# Patient Record
Sex: MALE | Race: BLACK OR AFRICAN AMERICAN | NOT HISPANIC OR LATINO | URBAN - METROPOLITAN AREA
[De-identification: names, ages, dates, MRNs, and addresses within clinical notes are randomized per-mention and may not be internally consistent; named-entity substitution may affect disease eponyms.]

---

## 2017-01-25 ENCOUNTER — OUTPATIENT (OUTPATIENT)
Dept: OUTPATIENT SERVICES | Facility: HOSPITAL | Age: 32
LOS: 1 days | Discharge: HOME | End: 2017-01-25

## 2017-04-05 ENCOUNTER — OUTPATIENT (OUTPATIENT)
Dept: OUTPATIENT SERVICES | Facility: HOSPITAL | Age: 32
LOS: 1 days | Discharge: HOME | End: 2017-04-05

## 2017-06-27 DIAGNOSIS — F31.30 BIPOLAR DISORDER, CURRENT EPISODE DEPRESSED, MILD OR MODERATE SEVERITY, UNSPECIFIED: ICD-10-CM

## 2017-06-28 ENCOUNTER — OUTPATIENT (OUTPATIENT)
Dept: OUTPATIENT SERVICES | Facility: HOSPITAL | Age: 32
LOS: 1 days | Discharge: HOME | End: 2017-06-28

## 2017-06-28 DIAGNOSIS — F20.0 PARANOID SCHIZOPHRENIA: ICD-10-CM

## 2017-06-28 DIAGNOSIS — F31.30 BIPOLAR DISORDER, CURRENT EPISODE DEPRESSED, MILD OR MODERATE SEVERITY, UNSPECIFIED: ICD-10-CM

## 2017-09-06 ENCOUNTER — OUTPATIENT (OUTPATIENT)
Dept: OUTPATIENT SERVICES | Facility: HOSPITAL | Age: 32
LOS: 1 days | Discharge: HOME | End: 2017-09-06

## 2017-09-06 DIAGNOSIS — F31.30 BIPOLAR DISORDER, CURRENT EPISODE DEPRESSED, MILD OR MODERATE SEVERITY, UNSPECIFIED: ICD-10-CM

## 2017-11-15 ENCOUNTER — OUTPATIENT (OUTPATIENT)
Dept: OUTPATIENT SERVICES | Facility: HOSPITAL | Age: 32
LOS: 1 days | Discharge: HOME | End: 2017-11-15

## 2017-11-15 DIAGNOSIS — F31.30 BIPOLAR DISORDER, CURRENT EPISODE DEPRESSED, MILD OR MODERATE SEVERITY, UNSPECIFIED: ICD-10-CM

## 2018-01-02 ENCOUNTER — OUTPATIENT (OUTPATIENT)
Dept: OUTPATIENT SERVICES | Facility: HOSPITAL | Age: 33
LOS: 1 days | Discharge: HOME | End: 2018-01-02

## 2018-01-02 DIAGNOSIS — F20.0 PARANOID SCHIZOPHRENIA: ICD-10-CM

## 2018-01-02 DIAGNOSIS — F31.0 BIPOLAR DISORDER, CURRENT EPISODE HYPOMANIC: ICD-10-CM

## 2018-01-17 ENCOUNTER — OUTPATIENT (OUTPATIENT)
Dept: OUTPATIENT SERVICES | Facility: HOSPITAL | Age: 33
LOS: 1 days | Discharge: HOME | End: 2018-01-17

## 2018-01-17 DIAGNOSIS — F31.30 BIPOLAR DISORDER, CURRENT EPISODE DEPRESSED, MILD OR MODERATE SEVERITY, UNSPECIFIED: ICD-10-CM

## 2018-03-09 ENCOUNTER — OUTPATIENT (OUTPATIENT)
Dept: OUTPATIENT SERVICES | Facility: HOSPITAL | Age: 33
LOS: 1 days | Discharge: HOME | End: 2018-03-09

## 2018-03-09 DIAGNOSIS — F31.30 BIPOLAR DISORDER, CURRENT EPISODE DEPRESSED, MILD OR MODERATE SEVERITY, UNSPECIFIED: ICD-10-CM

## 2018-05-09 ENCOUNTER — OUTPATIENT (OUTPATIENT)
Dept: OUTPATIENT SERVICES | Facility: HOSPITAL | Age: 33
LOS: 1 days | Discharge: HOME | End: 2018-05-09

## 2018-05-09 DIAGNOSIS — F31.30 BIPOLAR DISORDER, CURRENT EPISODE DEPRESSED, MILD OR MODERATE SEVERITY, UNSPECIFIED: ICD-10-CM

## 2018-05-16 ENCOUNTER — OUTPATIENT (OUTPATIENT)
Dept: OUTPATIENT SERVICES | Facility: HOSPITAL | Age: 33
LOS: 1 days | Discharge: HOME | End: 2018-05-16

## 2018-05-16 DIAGNOSIS — F31.30 BIPOLAR DISORDER, CURRENT EPISODE DEPRESSED, MILD OR MODERATE SEVERITY, UNSPECIFIED: ICD-10-CM

## 2018-07-18 ENCOUNTER — OUTPATIENT (OUTPATIENT)
Dept: OUTPATIENT SERVICES | Facility: HOSPITAL | Age: 33
LOS: 1 days | Discharge: HOME | End: 2018-07-18

## 2018-07-18 DIAGNOSIS — F31.30 BIPOLAR DISORDER, CURRENT EPISODE DEPRESSED, MILD OR MODERATE SEVERITY, UNSPECIFIED: ICD-10-CM

## 2018-09-19 ENCOUNTER — OUTPATIENT (OUTPATIENT)
Dept: OUTPATIENT SERVICES | Facility: HOSPITAL | Age: 33
LOS: 1 days | Discharge: HOME | End: 2018-09-19

## 2018-09-19 DIAGNOSIS — F31.30 BIPOLAR DISORDER, CURRENT EPISODE DEPRESSED, MILD OR MODERATE SEVERITY, UNSPECIFIED: ICD-10-CM

## 2018-11-21 ENCOUNTER — OUTPATIENT (OUTPATIENT)
Dept: OUTPATIENT SERVICES | Facility: HOSPITAL | Age: 33
LOS: 1 days | Discharge: HOME | End: 2018-11-21

## 2018-11-21 DIAGNOSIS — F31.30 BIPOLAR DISORDER, CURRENT EPISODE DEPRESSED, MILD OR MODERATE SEVERITY, UNSPECIFIED: ICD-10-CM

## 2019-01-24 ENCOUNTER — OUTPATIENT (OUTPATIENT)
Dept: OUTPATIENT SERVICES | Facility: HOSPITAL | Age: 34
LOS: 1 days | Discharge: HOME | End: 2019-01-24

## 2019-01-24 DIAGNOSIS — F31.0 BIPOLAR DISORDER, CURRENT EPISODE HYPOMANIC: ICD-10-CM

## 2019-02-06 ENCOUNTER — OUTPATIENT (OUTPATIENT)
Dept: OUTPATIENT SERVICES | Facility: HOSPITAL | Age: 34
LOS: 1 days | Discharge: HOME | End: 2019-02-06

## 2019-02-06 DIAGNOSIS — F31.30 BIPOLAR DISORDER, CURRENT EPISODE DEPRESSED, MILD OR MODERATE SEVERITY, UNSPECIFIED: ICD-10-CM

## 2019-04-10 ENCOUNTER — OUTPATIENT (OUTPATIENT)
Dept: OUTPATIENT SERVICES | Facility: HOSPITAL | Age: 34
LOS: 1 days | Discharge: HOME | End: 2019-04-10

## 2019-04-10 DIAGNOSIS — F31.74 BIPOLAR DISORDER, IN FULL REMISSION, MOST RECENT EPISODE MANIC: ICD-10-CM

## 2019-06-14 ENCOUNTER — OUTPATIENT (OUTPATIENT)
Dept: OUTPATIENT SERVICES | Facility: HOSPITAL | Age: 34
LOS: 1 days | Discharge: HOME | End: 2019-06-14

## 2019-06-14 DIAGNOSIS — Z00.00 ENCOUNTER FOR GENERAL ADULT MEDICAL EXAMINATION WITHOUT ABNORMAL FINDINGS: ICD-10-CM

## 2019-06-26 ENCOUNTER — OUTPATIENT (OUTPATIENT)
Dept: OUTPATIENT SERVICES | Facility: HOSPITAL | Age: 34
LOS: 1 days | Discharge: HOME | End: 2019-06-26

## 2019-06-26 DIAGNOSIS — F31.74 BIPOLAR DISORDER, IN FULL REMISSION, MOST RECENT EPISODE MANIC: ICD-10-CM

## 2019-09-25 ENCOUNTER — OUTPATIENT (OUTPATIENT)
Dept: OUTPATIENT SERVICES | Facility: HOSPITAL | Age: 34
LOS: 1 days | Discharge: HOME | End: 2019-09-25
Payer: SUBSIDIZED

## 2019-09-25 DIAGNOSIS — F31.74 BIPOLAR DISORDER, IN FULL REMISSION, MOST RECENT EPISODE MANIC: ICD-10-CM

## 2019-09-25 PROCEDURE — 99214 OFFICE O/P EST MOD 30 MIN: CPT

## 2019-12-07 ENCOUNTER — OUTPATIENT (OUTPATIENT)
Dept: OUTPATIENT SERVICES | Facility: HOSPITAL | Age: 34
LOS: 1 days | Discharge: HOME | End: 2019-12-07

## 2019-12-07 DIAGNOSIS — F31.74 BIPOLAR DISORDER, IN FULL REMISSION, MOST RECENT EPISODE MANIC: ICD-10-CM

## 2019-12-18 ENCOUNTER — OUTPATIENT (OUTPATIENT)
Dept: OUTPATIENT SERVICES | Facility: HOSPITAL | Age: 34
LOS: 1 days | Discharge: HOME | End: 2019-12-18
Payer: SUBSIDIZED

## 2019-12-18 DIAGNOSIS — F31.74 BIPOLAR DISORDER, IN FULL REMISSION, MOST RECENT EPISODE MANIC: ICD-10-CM

## 2019-12-18 PROCEDURE — 99213 OFFICE O/P EST LOW 20 MIN: CPT

## 2020-03-18 ENCOUNTER — OUTPATIENT (OUTPATIENT)
Dept: OUTPATIENT SERVICES | Facility: HOSPITAL | Age: 35
LOS: 1 days | Discharge: HOME | End: 2020-03-18
Payer: SUBSIDIZED

## 2020-03-18 PROCEDURE — 99441: CPT

## 2020-03-24 DIAGNOSIS — F31.74 BIPOLAR DISORDER, IN FULL REMISSION, MOST RECENT EPISODE MANIC: ICD-10-CM

## 2020-05-13 ENCOUNTER — OUTPATIENT (OUTPATIENT)
Dept: OUTPATIENT SERVICES | Facility: HOSPITAL | Age: 35
LOS: 1 days | Discharge: HOME | End: 2020-05-13

## 2020-05-13 DIAGNOSIS — F31.74 BIPOLAR DISORDER, IN FULL REMISSION, MOST RECENT EPISODE MANIC: ICD-10-CM

## 2020-07-29 ENCOUNTER — OUTPATIENT (OUTPATIENT)
Dept: OUTPATIENT SERVICES | Facility: HOSPITAL | Age: 35
LOS: 1 days | Discharge: HOME | End: 2020-07-29
Payer: SUBSIDIZED

## 2020-07-29 DIAGNOSIS — F31.74 BIPOLAR DISORDER, IN FULL REMISSION, MOST RECENT EPISODE MANIC: ICD-10-CM

## 2020-07-29 PROCEDURE — 99213 OFFICE O/P EST LOW 20 MIN: CPT | Mod: 95

## 2020-10-21 ENCOUNTER — OUTPATIENT (OUTPATIENT)
Dept: OUTPATIENT SERVICES | Facility: HOSPITAL | Age: 35
LOS: 1 days | Discharge: HOME | End: 2020-10-21
Payer: SUBSIDIZED

## 2020-10-21 DIAGNOSIS — F31.74 BIPOLAR DISORDER, IN FULL REMISSION, MOST RECENT EPISODE MANIC: ICD-10-CM

## 2020-10-21 PROCEDURE — 99213 OFFICE O/P EST LOW 20 MIN: CPT | Mod: 95

## 2021-01-13 ENCOUNTER — OUTPATIENT (OUTPATIENT)
Dept: OUTPATIENT SERVICES | Facility: HOSPITAL | Age: 36
LOS: 1 days | Discharge: HOME | End: 2021-01-13

## 2021-01-13 ENCOUNTER — APPOINTMENT (OUTPATIENT)
Dept: PSYCHIATRY | Facility: CLINIC | Age: 36
End: 2021-01-13
Payer: SUBSIDIZED

## 2021-01-13 DIAGNOSIS — F31.74 BIPOLAR DISORDER, IN FULL REMISSION, MOST RECENT EPISODE MANIC: ICD-10-CM

## 2021-01-13 PROBLEM — Z00.00 ENCOUNTER FOR PREVENTIVE HEALTH EXAMINATION: Status: ACTIVE | Noted: 2021-01-13

## 2021-01-13 PROCEDURE — 99442: CPT

## 2021-04-19 ENCOUNTER — APPOINTMENT (OUTPATIENT)
Dept: PSYCHIATRY | Facility: CLINIC | Age: 36
End: 2021-04-19
Payer: SUBSIDIZED

## 2021-04-19 ENCOUNTER — OUTPATIENT (OUTPATIENT)
Dept: OUTPATIENT SERVICES | Facility: HOSPITAL | Age: 36
LOS: 1 days | Discharge: HOME | End: 2021-04-19

## 2021-04-19 VITALS — BODY MASS INDEX: 24.64 KG/M2 | HEIGHT: 71 IN | WEIGHT: 176 LBS

## 2021-04-19 DIAGNOSIS — F31.74 BIPOLAR DISORDER, IN FULL REMISSION, MOST RECENT EPISODE MANIC: ICD-10-CM

## 2021-04-19 PROCEDURE — 99213 OFFICE O/P EST LOW 20 MIN: CPT | Mod: 95

## 2021-05-12 ENCOUNTER — OUTPATIENT (OUTPATIENT)
Dept: OUTPATIENT SERVICES | Facility: HOSPITAL | Age: 36
LOS: 1 days | Discharge: HOME | End: 2021-05-12

## 2021-05-12 ENCOUNTER — APPOINTMENT (OUTPATIENT)
Dept: HEMATOLOGY ONCOLOGY | Facility: CLINIC | Age: 36
End: 2021-05-12
Payer: SUBSIDIZED

## 2021-05-12 ENCOUNTER — LABORATORY RESULT (OUTPATIENT)
Age: 36
End: 2021-05-12

## 2021-05-12 VITALS
HEIGHT: 71 IN | HEART RATE: 57 BPM | DIASTOLIC BLOOD PRESSURE: 78 MMHG | TEMPERATURE: 96.2 F | SYSTOLIC BLOOD PRESSURE: 130 MMHG | BODY MASS INDEX: 24.5 KG/M2 | WEIGHT: 175 LBS | RESPIRATION RATE: 16 BRPM

## 2021-05-12 DIAGNOSIS — D72.819 DECREASED WHITE BLOOD CELL COUNT, UNSPECIFIED: ICD-10-CM

## 2021-05-12 PROCEDURE — 99204 OFFICE O/P NEW MOD 45 MIN: CPT

## 2021-05-12 NOTE — ASSESSMENT
[FreeTextEntry1] : 34 yo man from [facility] with  mild asymptomatic #Leukopenia, mild, noted since at least 2015. Neutropenia was noted single occurrence in April 2021, one month after completion of MODERNA vaccination (dose 2); today, his WBC and ANC are WNL.\par - denies frequent or recurrent infections\par \par \par #Microcytosis, mild, intermittent; noted since 2018\par - will draw iron studies, ferritin today\par - if normal, will consider sending hgb electrophoresis to r/o thalassemia\par \par RTC in 4 months with CBC, sooner if needed

## 2021-05-12 NOTE — CONSULT LETTER
[Dear  ___] : Dear  [unfilled], [Consult Letter:] : I had the pleasure of evaluating your patient, [unfilled]. [Sincerely,] : Sincerely, [FreeTextEntry3] : Bo Scruggs

## 2021-05-12 NOTE — HISTORY OF PRESENT ILLNESS
[de-identified] : Mr. JUAN FRANCISCO ALEJANDRA is a 35 year old male here today for evaluation and management of leukopenia.\par \par He was referred by Psychiatrist, Dr. Sue.  Juan Francisco is a 36yo M from [facility] with PMHx including bipolar disorder (on Lithium) and HSV2 who presents to clinic to establish care.  He states that he was has noted low WBC since at least 2018.  He denies any fevers, chills, frequent or recent infection.  He had COVID vaccine (Moderna #2) in 03/2021.  We reviewed labwork since 2015 which shows mild leukopenia but normal differential.  \par \par LAB WORKUP\par (4.11.2021) WBC 3.51, Hgb 14.1, MCV 84.6, , ANC 1.70, Cr 1.15, eGFR 81, Cr 1.15, eGFR 81\par (10.3.2020) WBC 4.06, Hgb 14.1, MCV 81.7, , normal diff\par (12.7.2019) WBC 5.9, Hgb 14.2, MCV 81.3, , normal diff\par (5.9.2018) WBC 4.7, Hgb 13.4, MCV 78.4, , normal diff\par (6.25.2015) WBC 3.5, Hgb 13.7, MCV 82.2, , normal diff

## 2021-05-12 NOTE — REVIEW OF SYSTEMS
[Negative] : Allergic/Immunologic [Fever] : no fever [Chills] : no chills [FreeTextEntry9] : reports intermittent L posterior rib pain x 1 week, worse with movement

## 2021-05-13 LAB
FERRITIN SERPL-MCNC: 125 NG/ML
HCT VFR BLD CALC: 42.8 %
HGB BLD-MCNC: 14 G/DL
IRON SATN MFR SERPL: 19 %
IRON SERPL-MCNC: 62 UG/DL
MCHC RBC-ENTMCNC: 25.8 PG
MCHC RBC-ENTMCNC: 32.7 G/DL
MCV RBC AUTO: 79 FL
PLATELET # BLD AUTO: 174 K/UL
PMV BLD: 10.9 FL
RBC # BLD: 5.42 M/UL
RBC # FLD: 13 %
TIBC SERPL-MCNC: 322 UG/DL
UIBC SERPL-MCNC: 260 UG/DL
WBC # FLD AUTO: 5.36 K/UL

## 2021-05-21 DIAGNOSIS — D72.819 DECREASED WHITE BLOOD CELL COUNT, UNSPECIFIED: ICD-10-CM

## 2021-07-12 ENCOUNTER — APPOINTMENT (OUTPATIENT)
Dept: PSYCHIATRY | Facility: CLINIC | Age: 36
End: 2021-07-12

## 2021-07-14 ENCOUNTER — OUTPATIENT (OUTPATIENT)
Dept: OUTPATIENT SERVICES | Facility: HOSPITAL | Age: 36
LOS: 1 days | Discharge: HOME | End: 2021-07-14

## 2021-07-14 ENCOUNTER — APPOINTMENT (OUTPATIENT)
Dept: PSYCHIATRY | Facility: CLINIC | Age: 36
End: 2021-07-14
Payer: SUBSIDIZED

## 2021-07-14 VITALS — WEIGHT: 175 LBS | BODY MASS INDEX: 24.5 KG/M2 | HEIGHT: 71 IN

## 2021-07-14 DIAGNOSIS — F31.74 BIPOLAR DISORDER, IN FULL REMISSION, MOST RECENT EPISODE MANIC: ICD-10-CM

## 2021-07-14 PROCEDURE — 99213 OFFICE O/P EST LOW 20 MIN: CPT

## 2021-09-22 ENCOUNTER — APPOINTMENT (OUTPATIENT)
Dept: HEMATOLOGY ONCOLOGY | Facility: CLINIC | Age: 36
End: 2021-09-22

## 2021-09-29 ENCOUNTER — APPOINTMENT (OUTPATIENT)
Dept: PSYCHIATRY | Facility: CLINIC | Age: 36
End: 2021-09-29
Payer: SUBSIDIZED

## 2021-09-29 ENCOUNTER — OUTPATIENT (OUTPATIENT)
Dept: OUTPATIENT SERVICES | Facility: HOSPITAL | Age: 36
LOS: 1 days | Discharge: HOME | End: 2021-09-29

## 2021-09-29 VITALS — HEIGHT: 71 IN | WEIGHT: 175 LBS | BODY MASS INDEX: 24.5 KG/M2

## 2021-09-29 DIAGNOSIS — F31.74 BIPOLAR DISORDER, IN FULL REMISSION, MOST RECENT EPISODE MANIC: ICD-10-CM

## 2021-09-29 PROCEDURE — 99213 OFFICE O/P EST LOW 20 MIN: CPT

## 2021-10-09 ENCOUNTER — LABORATORY RESULT (OUTPATIENT)
Age: 36
End: 2021-10-09

## 2021-12-22 ENCOUNTER — APPOINTMENT (OUTPATIENT)
Dept: PSYCHIATRY | Facility: CLINIC | Age: 36
End: 2021-12-22
Payer: SUBSIDIZED

## 2021-12-22 ENCOUNTER — OUTPATIENT (OUTPATIENT)
Dept: OUTPATIENT SERVICES | Facility: HOSPITAL | Age: 36
LOS: 1 days | Discharge: HOME | End: 2021-12-22

## 2021-12-22 VITALS — HEIGHT: 71 IN | BODY MASS INDEX: 23.85 KG/M2 | WEIGHT: 170.38 LBS

## 2021-12-22 DIAGNOSIS — F31.74 BIPOLAR DISORDER, IN FULL REMISSION, MOST RECENT EPISODE MANIC: ICD-10-CM

## 2021-12-22 PROCEDURE — 99213 OFFICE O/P EST LOW 20 MIN: CPT

## 2022-02-09 ENCOUNTER — OUTPATIENT (OUTPATIENT)
Dept: OUTPATIENT SERVICES | Facility: HOSPITAL | Age: 37
LOS: 1 days | Discharge: HOME | End: 2022-02-09

## 2022-02-09 ENCOUNTER — APPOINTMENT (OUTPATIENT)
Dept: PSYCHIATRY | Facility: CLINIC | Age: 37
End: 2022-02-09
Payer: SUBSIDIZED

## 2022-02-09 VITALS — HEIGHT: 71 IN | BODY MASS INDEX: 24.36 KG/M2 | WEIGHT: 174 LBS

## 2022-02-09 PROCEDURE — 99213 OFFICE O/P EST LOW 20 MIN: CPT | Mod: 95

## 2022-02-10 DIAGNOSIS — F31.74 BIPOLAR DISORDER, IN FULL REMISSION, MOST RECENT EPISODE MANIC: ICD-10-CM

## 2022-02-26 ENCOUNTER — LABORATORY RESULT (OUTPATIENT)
Age: 37
End: 2022-02-26

## 2022-03-23 ENCOUNTER — APPOINTMENT (OUTPATIENT)
Dept: PSYCHIATRY | Facility: CLINIC | Age: 37
End: 2022-03-23

## 2022-03-30 ENCOUNTER — OUTPATIENT (OUTPATIENT)
Dept: OUTPATIENT SERVICES | Facility: HOSPITAL | Age: 37
LOS: 1 days | Discharge: HOME | End: 2022-03-30

## 2022-03-30 ENCOUNTER — APPOINTMENT (OUTPATIENT)
Dept: PSYCHIATRY | Facility: CLINIC | Age: 37
End: 2022-03-30
Payer: SUBSIDIZED

## 2022-03-30 VITALS — HEIGHT: 71 IN | WEIGHT: 170 LBS | BODY MASS INDEX: 23.8 KG/M2

## 2022-03-30 DIAGNOSIS — F31.74 BIPOLAR DISORDER, IN FULL REMISSION, MOST RECENT EPISODE MANIC: ICD-10-CM

## 2022-03-30 DIAGNOSIS — Z63.0 PROBLEMS IN RELATIONSHIP WITH SPOUSE OR PARTNER: ICD-10-CM

## 2022-03-30 PROCEDURE — 99213 OFFICE O/P EST LOW 20 MIN: CPT

## 2022-03-30 SDOH — SOCIAL STABILITY - SOCIAL INSECURITY: PROBLEMS IN RELATIONSHIP WITH SPOUSE OR PARTNER: Z63.0

## 2022-06-15 ENCOUNTER — APPOINTMENT (OUTPATIENT)
Dept: PSYCHIATRY | Facility: CLINIC | Age: 37
End: 2022-06-15

## 2022-06-15 ENCOUNTER — OUTPATIENT (OUTPATIENT)
Dept: OUTPATIENT SERVICES | Facility: HOSPITAL | Age: 37
LOS: 1 days | Discharge: HOME | End: 2022-06-15

## 2022-06-15 VITALS — HEIGHT: 71 IN | WEIGHT: 173 LBS | BODY MASS INDEX: 24.22 KG/M2

## 2022-06-15 DIAGNOSIS — F31.74 BIPOLAR DISORDER, IN FULL REMISSION, MOST RECENT EPISODE MANIC: ICD-10-CM

## 2022-06-15 DIAGNOSIS — Z63.0 PROBLEMS IN RELATIONSHIP WITH SPOUSE OR PARTNER: ICD-10-CM

## 2022-06-15 PROCEDURE — 99213 OFFICE O/P EST LOW 20 MIN: CPT | Mod: 95

## 2022-06-15 SDOH — SOCIAL STABILITY - SOCIAL INSECURITY: PROBLEMS IN RELATIONSHIP WITH SPOUSE OR PARTNER: Z63.0

## 2022-08-19 ENCOUNTER — LABORATORY RESULT (OUTPATIENT)
Age: 37
End: 2022-08-19

## 2022-08-24 ENCOUNTER — OUTPATIENT (OUTPATIENT)
Dept: OUTPATIENT SERVICES | Facility: HOSPITAL | Age: 37
LOS: 1 days | Discharge: HOME | End: 2022-08-24

## 2022-08-24 ENCOUNTER — APPOINTMENT (OUTPATIENT)
Dept: PSYCHIATRY | Facility: CLINIC | Age: 37
End: 2022-08-24

## 2022-08-24 VITALS — HEIGHT: 71 IN | WEIGHT: 177.31 LBS | BODY MASS INDEX: 24.82 KG/M2

## 2022-08-24 DIAGNOSIS — Z63.0 PROBLEMS IN RELATIONSHIP WITH SPOUSE OR PARTNER: ICD-10-CM

## 2022-08-24 DIAGNOSIS — F31.74 BIPOLAR DISORDER, IN FULL REMISSION, MOST RECENT EPISODE MANIC: ICD-10-CM

## 2022-08-24 PROCEDURE — 99213 OFFICE O/P EST LOW 20 MIN: CPT

## 2022-08-24 SDOH — SOCIAL STABILITY - SOCIAL INSECURITY: PROBLEMS IN RELATIONSHIP WITH SPOUSE OR PARTNER: Z63.0

## 2022-09-14 ENCOUNTER — APPOINTMENT (OUTPATIENT)
Dept: PSYCHIATRY | Facility: CLINIC | Age: 37
End: 2022-09-14

## 2022-10-25 ENCOUNTER — APPOINTMENT (OUTPATIENT)
Dept: PSYCHIATRY | Facility: CLINIC | Age: 37
End: 2022-10-25

## 2022-10-25 ENCOUNTER — OUTPATIENT (OUTPATIENT)
Dept: OUTPATIENT SERVICES | Facility: HOSPITAL | Age: 37
LOS: 1 days | Discharge: HOME | End: 2022-10-25

## 2022-10-25 DIAGNOSIS — F31.74 BIPOLAR DISORDER, IN FULL REMISSION, MOST RECENT EPISODE MANIC: ICD-10-CM

## 2022-10-25 DIAGNOSIS — Z63.0 PROBLEMS IN RELATIONSHIP WITH SPOUSE OR PARTNER: ICD-10-CM

## 2022-10-25 PROCEDURE — 99214 OFFICE O/P EST MOD 30 MIN: CPT | Mod: 95

## 2022-10-25 RX ORDER — LITHIUM CARBONATE 300 MG/1
300 CAPSULE ORAL
Qty: 90 | Refills: 2 | Status: ACTIVE | COMMUNITY
Start: 2021-01-13 | End: 1900-01-01

## 2022-10-25 SDOH — SOCIAL STABILITY - SOCIAL INSECURITY: PROBLEMS IN RELATIONSHIP WITH SPOUSE OR PARTNER: Z63.0

## 2022-10-25 NOTE — REASON FOR VISIT
[Follow-Up Visit] : a follow-up visit [Home] : at home, [unfilled] , at the time of the visit. [Medical Office: ([facility])___] : at the medical office located in  [Verbal consent obtained from patient] : the patient, [unfilled] [FreeTextEntry1] : f/u of mood symptoms

## 2022-10-25 NOTE — RESULTS/DATA
[FreeTextEntry1] : 8/19/22\par \par lithium 0.66 \par cr  0.9\par BUN  12\par FBS  84\par \par CMP WNL\par \par WBC 3.64

## 2022-10-25 NOTE — PHYSICAL EXAM
[None] : none [Oriented] : oriented [Normal] : good [AH] : no auditory hallucinations [VH] : no visual hallucinations [Delusions] : no ~T delusions [Suicidal Ideation] : no suicidal ideation [Homicidal Ideation] : no homicidal ideation [Violent Thoughts] : no preoccupation with violent thoughts [FreeTextEntry1] : neatly dressed and groomed, well-related [FreeTextEntry9] : low intensity

## 2022-10-25 NOTE — DISCUSSION/SUMMARY
[FreeTextEntry1] : \par It was our first session after dr. Sue left. Solo tele visit.\par Patient's mood symptoms remain under control with current lithium carbonate dosage.   Reports that his marital issues have improved and he is happy to be back home with his wife and son. They are in couple therapy/counseling. Spoke about his illness and family situation. Tolerates LiCo3 well, but has mild tremor, which doesn't bother him at this point. Risks and benefits of propranolol were discussed, but I will not start it at this point as the tremor is very mild. Denies SI/HI/AH/PI. Denies drugs and ETOH.Sleep and appetite are OK. Last LiCo3 level is 0.66.\par \par Pt is a 38yo M, domiciled with his wife and their 7 yo son, (they had severe marital problems, but doing better at this time), employed (manager at residential facility), with long h/o mental illness, carries a Dx of BPD, with h/o 2 prior IPP (including for helen, 2013 and 2014) ,no SA, no DOMINIC, was tried on risperdal and haldol with unfavorable results, currently only on LiCo3 and stable for years.\par \par Plan:    Lithium carbonate 300mg po qam and 600mg po qhs\par             Pt advised to schedule f/u with hematology regarding decreased WBC count \par            Next appt in 1 m tele\par Refer pt for labs with LiCo3

## 2022-10-25 NOTE — REVIEW OF SYSTEMS
[Negative] : Musculoskeletal [FreeTextEntry2] : no fevers, chills [FreeTextEntry7] : no diarrhea [de-identified] : no tremors, abnormal movements

## 2022-10-25 NOTE — HISTORY OF PRESENT ILLNESS
[No] : no [None Known] : none known [None] : none [Other___] : [unfilled] [Responsibility to family or others] : responsibility to family or others [Engaged in work or school] : engaged in work or school [Supportive social network or family] : supportive social network or family [Positive therapeutic relationships] : positive therapeutic relationships [de-identified] : \par It was our first session after dr. Sue left. Solo tele visit.\par Patient's mood symptoms remain under control with current lithium carbonate dosage.   Reports that his marital issues have improved and he is happy to be back home with his wife and son. They are in couple therapy/counseling. Spoke about his illness and family situation. Tolerates LiCo3 well, but has mild tremor, which doesn't bother him at this point. Risks and benefits of propranolol were discussed, but I will not start it at this point as the tremor is very mild. Denies SI/HI/AH/PI. Denies drugs and ETOH.Sleep and appetite are OK. Last LiCo3 level is 0.66.\par \par \par

## 2023-08-09 NOTE — SOCIAL HISTORY
[Alone] : lives alone [Employed] : employed [] :  [# Of Children ___] : has [unfilled] children [College] : College stated